# Patient Record
Sex: FEMALE | Race: WHITE | NOT HISPANIC OR LATINO | Employment: OTHER | URBAN - METROPOLITAN AREA
[De-identification: names, ages, dates, MRNs, and addresses within clinical notes are randomized per-mention and may not be internally consistent; named-entity substitution may affect disease eponyms.]

---

## 2023-11-27 ENCOUNTER — OFFICE VISIT (OUTPATIENT)
Dept: ORTHOPEDIC SURGERY | Facility: CLINIC | Age: 83
End: 2023-11-27
Payer: MEDICARE

## 2023-11-27 ENCOUNTER — ANCILLARY PROCEDURE (OUTPATIENT)
Dept: RADIOLOGY | Facility: CLINIC | Age: 83
End: 2023-11-27
Payer: MEDICARE

## 2023-11-27 DIAGNOSIS — M19.019 SHOULDER ARTHRITIS: Primary | ICD-10-CM

## 2023-11-27 DIAGNOSIS — M25.511 CHRONIC RIGHT SHOULDER PAIN: ICD-10-CM

## 2023-11-27 DIAGNOSIS — G89.29 CHRONIC RIGHT SHOULDER PAIN: ICD-10-CM

## 2023-11-27 PROCEDURE — 2500000004 HC RX 250 GENERAL PHARMACY W/ HCPCS (ALT 636 FOR OP/ED): Performed by: NURSE PRACTITIONER

## 2023-11-27 PROCEDURE — 99213 OFFICE O/P EST LOW 20 MIN: CPT | Performed by: NURSE PRACTITIONER

## 2023-11-27 PROCEDURE — 1159F MED LIST DOCD IN RCRD: CPT | Performed by: NURSE PRACTITIONER

## 2023-11-27 PROCEDURE — 73030 X-RAY EXAM OF SHOULDER: CPT | Mod: RIGHT SIDE | Performed by: RADIOLOGY

## 2023-11-27 PROCEDURE — 2500000005 HC RX 250 GENERAL PHARMACY W/O HCPCS: Performed by: NURSE PRACTITIONER

## 2023-11-27 PROCEDURE — 73030 X-RAY EXAM OF SHOULDER: CPT | Mod: RT,FY

## 2023-11-27 PROCEDURE — 1160F RVW MEDS BY RX/DR IN RCRD: CPT | Performed by: NURSE PRACTITIONER

## 2023-11-27 RX ADMIN — TRIAMCINOLONE ACETONIDE 40 MG: 40 INJECTION, SUSPENSION INTRA-ARTICULAR; INTRAMUSCULAR at 09:51

## 2023-11-27 RX ADMIN — LIDOCAINE HYDROCHLORIDE 4 ML: 10 INJECTION, SOLUTION INFILTRATION; PERINEURAL at 09:51

## 2023-11-27 NOTE — PROGRESS NOTES
"Diagnoses/Problems  Assessed    · Shoulder pain, right (719.41) (M25.511)   · Shoulder arthritis (716.91) (M19.019)    Provider Impressions  If she decides on surgery, will not be until next Spring 2024. The injections continue to help her approximately every 10 weeks. She would like another one today. We reviewed the surgery process today and she is comfortable continuing with injections currently. We reviewed her new xrays of right shoulder today. We have scheduled to see her back in 10 weeks. No repeat imaging at that time. She is aware that she needs 3 month interval between injections and surgery.     All of her questions were answered today and she is comfortable with this plan. She knows how to reach the office if she has any additional questions prior to being seen again.     Should you have any questions or concerns after your visit today, please do not hesitate to call the office at [phone]. We strive to give you high-quality, patient-centered, collaborative care and I am honored to be a part of your health care team.        Chief Complaint  FUV- R shoulder injection      History of Present Illness  Luz \"Sarahi\" Shekhar is a pleasant 82 year old female, right hand dominant, returning to clinic today for evaluation of her right shoulder. She explains that she has a history of shoulder arthritis. In the past steroid injections have helped anywhere form 3-6 months at a time and she is hoping for a steroid injection today for her right shoulder. Her left shoulder only bothers her intermittently, but her range of motion continues to be quite good on this side. She has never had any injections for her left shoulder and she is fine with that management at this time. She reports that her right shoulder injection wore off in about a month and she is ready for a repeat right injection today.     She notices the pain when she swims, but she is still able to do it three times per week. She is able to do the motion, " it just hurts. She will take Voltaren topical gel which helps her better than Tylenol currently. Recently underwent thumb surgery, doing well with that.     9/25/23 - Patient would like consideration of a repeat injection for her right shoulder today. This has been working well for her for about 3 months. She continues to do aerobics in the water.     11/27/23 - She continues to get approximately 10 weeks of relief from the injection in her right shoulder. She may decide to pursue shoulder replacement next year. She also understands that it is totally up to her when and if replacement takes place.     Non-diabetic.     Past medical history, surgical history, social history, and family history were all reviewed and are as per the [facility] patient health history questionnaire form that was signed and scanned into the chart.      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 Breathes with normal chest rises. Pupils are round and symmetric today. Awake alert and oriented x3.    Examination of the left shoulder reveals the skin to be intact. 160 degrees of forward elevation.    Examination of the right shoulder reveals the skin to be intact. Symptoms of pain do not refer past her elbow. Forward elevation to 130 degrees. External rotation to 10 degrees. Internal rotation to her sacrum. Abduction to 90 degrees with pain.     Results/Data  X-ray bilateral shoulder (1/31/22) shows no signs of any fracture or dislocation. With signs of moderate glenohumeral arthritis, right side, mild changes on the left.     Xray  today (11/27/23) of right shoulder reveals severe arthritis. No signs of fracture or dislocation.   Reviewed imaging personally with patient today.      Procedure  Risks, benefits and alternatives of injection discussed with the patient prior to injection. After receiving verbal informed consent from the patient, I sterilely prepped the RIGHT shoulder posteriorly and under sterile conditions injected 40mg of Kenalog and 4ml of 1% lidocaine into the posterior glenohumeral joint space. Injection site wiped with a sterile gauze after procedure and Band-Aid placed. The patient tolerated the procedure well and without complication. They can use ice on injection site if discomfort following injection today. Allow 24-48 hours for the injection to start to relieve pain and discomfort of the shoulder. Limit overhead and lifting activities for 48 hours.    L Inj/Asp: R glenohumeral on 12/10/2023 11:18 PM  Indications: pain  Details: 21 G needle, posterior approach  Procedure, treatment alternatives, risks and benefits explained, specific risks discussed. Consent was given by the patient. Immediately prior to procedure a time out was called to verify the correct patient, procedure, equipment, support staff and site/side marked as required. Patient was prepped and draped in the usual sterile fashion.

## 2023-11-27 NOTE — PROGRESS NOTES
Patient ID: Luz Corrigan is a 83 y.o. female.    L Inj/Asp: R glenohumeral on 11/27/2023 9:51 AM  Indications: pain  Details: 22 G needle  Medications: 40 mg triamcinolone acetonide 40 mg/mL; 4 mL lidocaine 10 mg/mL (1 %)  Outcome: tolerated well, no immediate complications  Procedure, treatment alternatives, risks and benefits explained, specific risks discussed. Consent was given by the patient. Immediately prior to procedure a time out was called to verify the correct patient, procedure, equipment, support staff and site/side marked as required. Patient was prepped and draped in the usual sterile fashion.

## 2023-12-04 ENCOUNTER — APPOINTMENT (OUTPATIENT)
Dept: ORTHOPEDIC SURGERY | Facility: CLINIC | Age: 83
End: 2023-12-04
Payer: MEDICARE

## 2023-12-10 PROCEDURE — 20610 DRAIN/INJ JOINT/BURSA W/O US: CPT | Performed by: NURSE PRACTITIONER

## 2023-12-10 RX ORDER — TRIAMCINOLONE ACETONIDE 40 MG/ML
40 INJECTION, SUSPENSION INTRA-ARTICULAR; INTRAMUSCULAR
Status: COMPLETED | OUTPATIENT
Start: 2023-11-27 | End: 2023-11-27

## 2023-12-10 RX ORDER — LIDOCAINE HYDROCHLORIDE 10 MG/ML
4 INJECTION INFILTRATION; PERINEURAL
Status: COMPLETED | OUTPATIENT
Start: 2023-11-27 | End: 2023-11-27

## 2024-01-15 ENCOUNTER — APPOINTMENT (OUTPATIENT)
Dept: ORTHOPEDIC SURGERY | Facility: CLINIC | Age: 84
End: 2024-01-15
Payer: MEDICARE

## 2024-01-22 ENCOUNTER — OFFICE VISIT (OUTPATIENT)
Dept: ORTHOPEDIC SURGERY | Facility: CLINIC | Age: 84
End: 2024-01-22
Payer: MEDICARE

## 2024-01-22 DIAGNOSIS — M25.511 CHRONIC RIGHT SHOULDER PAIN: ICD-10-CM

## 2024-01-22 DIAGNOSIS — M19.019 SHOULDER ARTHRITIS: Primary | ICD-10-CM

## 2024-01-22 DIAGNOSIS — G89.29 CHRONIC RIGHT SHOULDER PAIN: ICD-10-CM

## 2024-01-22 PROCEDURE — 99212 OFFICE O/P EST SF 10 MIN: CPT | Performed by: NURSE PRACTITIONER

## 2024-01-22 PROCEDURE — 1159F MED LIST DOCD IN RCRD: CPT | Performed by: NURSE PRACTITIONER

## 2024-01-22 PROCEDURE — 1160F RVW MEDS BY RX/DR IN RCRD: CPT | Performed by: NURSE PRACTITIONER

## 2024-01-22 PROCEDURE — 20610 DRAIN/INJ JOINT/BURSA W/O US: CPT | Performed by: NURSE PRACTITIONER

## 2024-01-22 NOTE — PROGRESS NOTES
"Provider Impression/Assessment:   Patient with right shoulder arthritis, sustained relief with injections every 10 weeks. Not quite ready for shoulder replacement.     Patient Discussion/Plan:  We reviewed with patient today the various options for the arthritis in their shoulder. They can do nothing and continue with activity modifications. Consider cortisone injections into the glenohumeral joint. This would give pain relief that is temporary and would not stop the progression of arthritis. This injection can last not at all, for 2 weeks, 4 weeks, 3 months or longer. Each patient has a different outcome. The risk of injection is fairly minimal, but does included a very small chance of infection. Consider a total shoulder replacement. This will give the patient a more permanent solution to pain relief. It would also improve function. This is an elective procedure and there is no rush.     If she decides on surgery, will not be until this Spring/Summer 2024. The injections continue to help her approximately every 10 weeks. She would like another one today. We reviewed the surgery process today and she is comfortable continuing with injections currently. We reviewed her new xrays of right shoulder at her last visit. No new imaging today.     We have scheduled to see her back in 10 weeks. No repeat imaging at that time. She is aware that she needs 3 month interval between injections and surgery. All of her questions were answered today and she is comfortable with this plan. She knows how to reach the office if she has any additional questions prior to being seen again.      _______________________________________________________________________________________________________________________________________   Chief Complaint  FUV- R shoulder injection   DOI: 11/27/23     History of Present Illness  Luz \"Sarahi\" Shekhar is a pleasant 82 year old female, right hand dominant, returning to clinic today for evaluation of her " right shoulder. She explains that she has a history of shoulder arthritis. In the past steroid injections have helped anywhere form 3-6 months at a time and she is hoping for a steroid injection today for her right shoulder. Her left shoulder only bothers her intermittently, but her range of motion continues to be quite good on this side. She has never had any injections for her left shoulder and she is fine with that management at this time. She reports that her right shoulder injection wore off in about a month and she is ready for a repeat right injection today.      She notices the pain when she swims, but she is still able to do it three times per week. She is able to do the motion, it just hurts. She will take Voltaren topical gel which helps her better than Tylenol currently. Recently underwent thumb surgery, doing well with that.      9/25/23 - Patient would like consideration of a repeat injection for her right shoulder today. This has been working well for her for about 3 months. She continues to do aerobics in the water.      11/27/23 - She continues to get approximately 10 weeks of relief from the injection in her right shoulder. She may decide to pursue shoulder replacement next year. She also understands that it is totally up to her when and if replacement takes place.     1/22/24 - Patient returns for her right shoulder. She continues to get approx. 10 weeks of sustained relief with injections and would like another one today. She continues to swim, which helps her shoulder with mobility and does not hurt as much in the water. She understands the 3 month interval between injections and surgery and that there is no rush and the decision is fully hers.     Non-diabetic.      Past medical history, surgical history, social history, and family history were all reviewed and are as per the [facility] patient health history questionnaire form that was signed and scanned into the chart.      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 Breathes with normal chest rises. Pupils are round and symmetric today. Awake alert and oriented x3.     Examination of the left shoulder reveals the skin to be intact. 160 degrees of forward elevation.     Examination of the right shoulder reveals the skin to be intact. Symptoms of pain do not refer past her elbow. Forward elevation to 130 degrees. External rotation to 10 degrees. Internal rotation to her sacrum. Abduction to 90 degrees with pain.      Results/Data  X-ray bilateral shoulder (1/31/22) shows no signs of any fracture or dislocation. With signs of moderate glenohumeral arthritis, right side, mild changes on the left.      Xray today (11/27/23) of right shoulder reveals severe arthritis. No signs of fracture or dislocation.     No new imaging to review today.      Procedure  Risks, benefits and alternatives of injection discussed with the patient prior to injection. After receiving verbal informed consent from the patient, I sterilely prepped the RIGHT shoulder posteriorly and under sterile conditions injected 40mg of Kenalog and 4ml of 1% lidocaine into the posterior glenohumeral joint space. Injection site wiped with a sterile gauze after procedure and Band-Aid placed. The patient tolerated the procedure well and without complication. They can use ice on injection site if discomfort following injection today. Allow 24-48 hours for the  injection to start to relieve pain and discomfort of the shoulder. Limit overhead and lifting activities for 48 hours.    L Inj/Asp: R glenohumeral on 1/22/2024 5:56 PM  Indications: pain  Details: 21 G needle, posterior approach  Procedure, treatment alternatives, risks and benefits explained, specific risks discussed. Consent was given by the patient. Immediately prior to procedure a time out was called to verify the correct patient, procedure, equipment, support staff and site/side marked as required. Patient was prepped and draped in the usual sterile fashion.

## 2024-04-01 ENCOUNTER — APPOINTMENT (OUTPATIENT)
Dept: ORTHOPEDIC SURGERY | Facility: CLINIC | Age: 84
End: 2024-04-01
Payer: MEDICARE

## 2024-04-08 ENCOUNTER — APPOINTMENT (OUTPATIENT)
Dept: ORTHOPEDIC SURGERY | Facility: CLINIC | Age: 84
End: 2024-04-08
Payer: MEDICARE

## 2024-04-29 ENCOUNTER — OFFICE VISIT (OUTPATIENT)
Dept: ORTHOPEDIC SURGERY | Facility: CLINIC | Age: 84
End: 2024-04-29
Payer: MEDICARE

## 2024-04-29 DIAGNOSIS — Z01.818 PRE-OP EVALUATION: ICD-10-CM

## 2024-04-29 DIAGNOSIS — M19.019 SHOULDER ARTHRITIS: Primary | ICD-10-CM

## 2024-04-29 PROCEDURE — 99214 OFFICE O/P EST MOD 30 MIN: CPT | Performed by: NURSE PRACTITIONER

## 2024-04-29 PROCEDURE — 1160F RVW MEDS BY RX/DR IN RCRD: CPT | Performed by: NURSE PRACTITIONER

## 2024-04-29 PROCEDURE — 2500000004 HC RX 250 GENERAL PHARMACY W/ HCPCS (ALT 636 FOR OP/ED): Performed by: NURSE PRACTITIONER

## 2024-04-29 PROCEDURE — 1159F MED LIST DOCD IN RCRD: CPT | Performed by: NURSE PRACTITIONER

## 2024-04-29 PROCEDURE — 2500000005 HC RX 250 GENERAL PHARMACY W/O HCPCS: Performed by: NURSE PRACTITIONER

## 2024-05-02 ENCOUNTER — HOSPITAL ENCOUNTER (OUTPATIENT)
Facility: HOSPITAL | Age: 84
Setting detail: OUTPATIENT SURGERY
End: 2024-05-02
Attending: ORTHOPAEDIC SURGERY | Admitting: ORTHOPAEDIC SURGERY
Payer: MEDICARE

## 2024-05-02 DIAGNOSIS — M19.019 SHOULDER ARTHRITIS: Primary | ICD-10-CM

## 2024-05-02 PROCEDURE — 2500000004 HC RX 250 GENERAL PHARMACY W/ HCPCS (ALT 636 FOR OP/ED): Performed by: NURSE PRACTITIONER

## 2024-05-02 PROCEDURE — 20610 DRAIN/INJ JOINT/BURSA W/O US: CPT | Performed by: NURSE PRACTITIONER

## 2024-05-02 PROCEDURE — 2500000005 HC RX 250 GENERAL PHARMACY W/O HCPCS: Performed by: NURSE PRACTITIONER

## 2024-05-02 RX ORDER — LIDOCAINE HYDROCHLORIDE 10 MG/ML
4 INJECTION INFILTRATION; PERINEURAL
Status: COMPLETED | OUTPATIENT
Start: 2024-05-02 | End: 2024-05-02

## 2024-05-02 RX ORDER — TRIAMCINOLONE ACETONIDE 40 MG/ML
40 INJECTION, SUSPENSION INTRA-ARTICULAR; INTRAMUSCULAR
Status: COMPLETED | OUTPATIENT
Start: 2024-05-02 | End: 2024-05-02

## 2024-05-02 RX ADMIN — LIDOCAINE HYDROCHLORIDE 4 ML: 10 INJECTION, SOLUTION INFILTRATION; PERINEURAL at 20:36

## 2024-05-02 RX ADMIN — TRIAMCINOLONE ACETONIDE 40 MG: 400 INJECTION, SUSPENSION INTRA-ARTICULAR; INTRAMUSCULAR at 20:36

## 2024-05-03 ENCOUNTER — HOSPITAL ENCOUNTER (OUTPATIENT)
Dept: RADIOLOGY | Facility: CLINIC | Age: 84
Discharge: HOME | End: 2024-05-03
Payer: MEDICARE

## 2024-05-03 DIAGNOSIS — M19.019 SHOULDER ARTHRITIS: ICD-10-CM

## 2024-05-03 PROCEDURE — 73200 CT UPPER EXTREMITY W/O DYE: CPT | Mod: RT

## 2024-05-03 NOTE — PROGRESS NOTES
Provider Impression/Assessment:  Luz Corrigan has end-stage arthritis of right shoulder. She tolerated the injection well for her right shoulder today.     Patient Discussion/Plan:  Patient has failed conservative management of injections, therapy and anti-inflammatories. The risks benefits and alternatives of an anatomic shoulder replacement were discussed at length with Dr Osei previously. This was reviewed today. The risks of surgery are infection, dislocation, nerve injury, blood loss. The benefits are pain relief and restoration of function and activities of daily life. The patient verbalize an understanding of the risks benefits alternatives and the expected outcomes and wishes to proceed with elective surgery. The rehabilitation after surgery was discussed at length. It would be 4 weeks in a sling with activities as tolerated at the waist level. Following this they will get into physical therapy for stretching and range of motion exercises. We anticipate they will make a near full recovery around 3-4 months, but with continued improvement up to a year after surgery. We also discussed the hospital course. Usually patients stay one night but sometimes they are able to leave same day. We will plan on Luz Corrigan leaving same day.     PLAN is RIGHT anatomic total shoulder replacement, possible reverse replacement pending CT scan and final review by Dr Osei.      We have scheduled them for shoulder replacement on 7/24/24 to take place at [facility]. They will need to be fitted for the post-operative sling closer to surgery. They will need to be seen and cleared by the anesthesia team prior to surgery. Patient will need to obtain a CT scan prior to surgery of their right shoulder to be sure that we are not missing anything from a structural standpoint and for pre-operative planning.     Patient was scheduled to see Dr Osei in early June for final surgical plan and CT imaging review.    "    All of their questions were answered today to their satisfaction and they are in agreement with the above plan. They know how to reach the office if any additional questions arise prior to surgery date.     Should you have any questions or concerns after your visit today, please do not hesitate to call the office at [phone]. I am honored to be a provider on your health care team and remain dedicated to helping you achieve your healthcare goals    -------------------------------------------------------------------------------------------------  CHIEF COMPLAINT:  PRE-OPERATIVE VISIT  RIGHT SHOULDER  INJECTION    History of Present Illness:  Luz \"Sarahi\" Shekhar is a pleasant 82 year old female, right hand dominant, returning to clinic today for evaluation of her right shoulder. She explains that she has a history of shoulder arthritis. In the past steroid injections have helped anywhere form 3-6 months at a time and she is hoping for a steroid injection today for her right shoulder. Her left shoulder only bothers her intermittently, but her range of motion continues to be quite good on this side. She has never had any injections for her left shoulder and she is fine with that management at this time. She reports that her right shoulder injection wore off in about a month and she is ready for a repeat right injection today.      She notices the pain when she swims, but she is still able to do it three times per week. She is able to do the motion, it just hurts. She will take Voltaren topical gel which helps her better than Tylenol currently. Recently underwent thumb surgery, doing well with that.      9/25/23 - Patient would like consideration of a repeat injection for her right shoulder today. This has been working well for her for about 3 months. She continues to do aerobics in the water.      11/27/23 - She continues to get approximately 10 weeks of relief from the injection in her right shoulder. She may " decide to pursue shoulder replacement next year. She also understands that it is totally up to her when and if replacement takes place.      1/22/24 - Patient returns for her right shoulder. She continues to get approx. 10 weeks of sustained relief with injections and would like another one today. She continues to swim, which helps her shoulder with mobility and does not hurt as much in the water. She understands the 3 month interval between injections and surgery and that there is no rush and the decision is fully hers.    4/29/24 - Sarahi returns today for her right shoulder. She gets about 8-10 weeks of sustained with injections with her right shoulder. She continues to swim, which helps her shoulder with mobility and does not hurt as much in the water. She would like another injection for her shoulder today, with plan of moving forward with replacement surgery at the end of July 2024. Her daughter is on speaker phone for the office visit today.      Non-diabetic.   Non smoker     Past medical history, surgical history, social history, and family history were all reviewed and are as per the [facility] patient health history questionnaire form that was signed and scanned into the chart.       Review of Systems:   Review of systems for all 14 systems is negative for complaint except for the above noted findings in the history of present illness.    Family, social, and medical histories are obtained and reviewed.    Allergies:  Not on File    No past medical history on file.    No past surgical history on file.    Social History     Socioeconomic History    Marital status:      Spouse name: Not on file    Number of children: Not on file    Years of education: Not on file    Highest education level: Not on file   Occupational History    Not on file   Tobacco Use    Smoking status: Not on file    Smokeless tobacco: Not on file   Substance and Sexual Activity    Alcohol use: Not on file    Drug use: Not on file    Sexual  activity: Not on file   Other Topics Concern    Not on file   Social History Narrative    Not on file     Social Determinants of Health     Financial Resource Strain: Medium Risk (4/13/2023)    Received from [facility]    Overall Financial Resource Strain (CARDIA)     Difficulty of Paying Living Expenses: Somewhat hard   Food Insecurity: No Food Insecurity (1/2/2024)    Received from [facility]    Hunger Vital Sign     Worried About Running Out of Food in the Last Year: Never true     Ran Out of Food in the Last Year: Never true   Transportation Needs: No Transportation Needs (1/2/2024)    Received from [facility]    PRAPARE - Transportation     Lack of Transportation (Medical): No     Lack of Transportation (Non-Medical): No   Physical Activity: Sufficiently Active (1/2/2024)    Received from [facility]    Exercise Vital Sign     Days of Exercise per Week: 3 days     Minutes of Exercise per Session: 120 min   Stress: No Stress Concern Present (1/2/2024)    Received from [facility]    [place] of Occupational [employer] - Occupational Stress Questionnaire     Feeling of Stress : Not at all   Social Connections: Moderately Isolated (1/2/2024)    Received from [facility]    Social Connection and Isolation Panel [NHANES]     Frequency of Communication with Friends and Family: More than three times a week     Frequency of Social Gatherings with Friends and Family: Three times a week     Attends [employer]al Services: Never     Active Member of Clubs or Organizations: Yes     Attends Club or Organization Meetings: More than 4 times per year     Marital Status:    Intimate Partner Violence: Not At Risk (1/2/2024)    Received from [facility]    Humiliation, Afraid, Rape, and Kick questionnaire     Fear of Current or Ex-Partner: No     Emotionally Abused: No     Physically Abused: No     Sexually Abused: No   Housing Stability: Low Risk  (1/2/2024)    Received from [facility]    Housing Stability Vital Sign      Unable to Pay for Housing in the Last Year: No     Number of Places Lived in the Last Year: 1     Unstable Housing in the Last Year: No       Medications:  No current outpatient medications on file.    Family History:  No family history on file.    Diagnoses/Problems:  RIGHT shoulder arthritis    Physical Examination:  Patient is a well-developed, well-nourished female in no acute distress. Breathes with normal chest rises. Pupils are round and symmetric today. Awake alert and oriented x3.     Examination of the left shoulder reveals the skin to be intact. 160 degrees of forward elevation.     Examination of the right shoulder reveals the skin to be intact. Symptoms of pain do not refer past her elbow. Forward elevation to 130 degrees. External rotation to 10 degrees. Internal rotation to her sacrum. Abduction to 90 degrees with pain.     Results/Imaging:  X-ray bilateral shoulder (1/31/22) shows no signs of any fracture or dislocation. With signs of moderate glenohumeral arthritis, right side, mild changes on the left.      Xray today (11/27/23) of right shoulder reveals severe arthritis. No signs of fracture or dislocation.     Procedure  Risks, benefits and alternatives of injection discussed with the patient prior to injection. After receiving verbal informed consent from the patient, I sterilely prepped the RIGHT shoulder posteriorly and under sterile conditions injected 40mg of Kenalog and 4ml of 1% lidocaine into the posterior glenohumeral joint space. Injection site wiped with a sterile gauze after procedure and Band-Aid placed. The patient tolerated the procedure well and without complication. They can use ice on injection site if discomfort following injection today. Allow 24-48 hours for the injection to start to relieve pain and discomfort of the shoulder. Limit overhead and lifting activities for 48 hours.                Medical Equipment:  Patient was prescribed a shoulder sling for postoperative  care. The patient will have weakness, instability and/or deformity of their right arm which requires stabilization from this orthosis to improve their function after surgery.     Verbal and written instructions for the use, wear schedule, cleaning and application of this item were given. Patient was instructed that should the brace result in increased pain, decreased sensation, increased swelling, or an overall worsening of their medical condition, to please contact our office immediately at [phone].     Orthotic management and training was provided for skin care, modifications due to healing tissues, edema changes, interruption in skin integrity, and safety precautions with the orthosis.      L Inj/Asp: R glenohumeral on 5/2/2024 8:36 PM  Indications: pain  Details: 21 G needle, posterior approach  Medications: 40 mg triamcinolone acetonide 40 mg/mL; 4 mL lidocaine 10 mg/mL (1 %)  Procedure, treatment alternatives, risks and benefits explained, specific risks discussed. Consent was given by the patient. Immediately prior to procedure a time out was called to verify the correct patient, procedure, equipment, support staff and site/side marked as required. Patient was prepped and draped in the usual sterile fashion.       *While intending to generate a timely document that accurately reflects the content of the visit, no guarantee can be provided that every grammatical or spelling mistake has been or will be identified or corrected. Thank you for your understanding.

## 2024-06-02 NOTE — PROGRESS NOTES
Subjective    Patient ID: Luz Corrigan is a 83 y.o. female.    Chief Complaint: No chief complaint on file.     Last Surgery: No surgery found  Last Surgery Date: No surgery found    PAMELLA Escobar is a 83 y.o. right hand dominant female presenting today for evaluation of their right shoulder.     She is here today to discuss her recent CT imaging and pre-surgical planning. Her shoulder is not doing well today. She last saw David 5 weeks ago. She set up her surgical date on July 24th. Injection she was given did help but have started to become less effective.     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     Past medical history, surgical history, social history, and family history were all reviewed and are as per the [facility] patient health history questionnaire form that I signed and scanned into the chart today.     Objective   Ortho Exam  Patient is a well-developed, well-nourished female in no acute distress. Breathes with normal chest rises. Pupils are round and symmetric today. Awake alert and oriented x3.     Examination of the left shoulder reveals the skin to be intact. 160 degrees of forward elevation.     Examination of the right shoulder reveals the skin to be intact. Symptoms of pain do not refer past her elbow. Forward elevation to 130 degrees. External rotation to 10 degrees. Internal rotation to her sacrum. Abduction to 90 degrees with pain.     Image Results:  CT shoulder right wo IV contrast  Narrative: Interpreted By:  Kamron Kim,   STUDY:  CT of the  right shoulder without intravenous contrast dated  5/3/2024.      INDICATION:  Signs/Symptoms:blueprint protocol, PRE-OP PLANNING      COMPARISON:  None.      ACCESSION NUMBER(S):  [id number]      ORDERING CLINICIAN:  DAVID NGUYEN      TECHNIQUE:  Axial CT of the   right shoulder was performed  without  intravenous  contrast. Blueprint protocol was utilized. Sagittal and coronal 2  dimensional reformats were obtained.      FINDINGS:  OSSEOUS STRUCTURES AND JOINTS:      No fracture or dislocation is evident. There is severe glenohumeral  degenerative change. There is moderate acromioclavicular joint  degenerative change. Moderate degenerative changes seen of the  sternoclavicular joints as visualized. Multilevel degenerative  changes seen of the visualized spine. There is a moderate volume  glenohumeral joint effusion mostly collecting in the subscapular  recess with an intracapsular osteochondral body in the subscapular  recess.      MUSCLES AND TENDONS:      There is mild-to-moderate atrophy in the teres minor muscle out of  proportion to the remainder of the musculature.      ASSOCIATED SOFT TISSUES:      The thyroid gland is enlarged and heterogeneous. Calcified  atheromatous disease is seen in the visualized arterial tree.  Respiratory motion limits assessment of the lung parenchyma. Given  this, atelectasis is seen in the lungs.      Impression: 1. Severe glenohumeral and moderate acromioclavicular joint  degenerative change.  2. Mild-to-moderate atrophy of the teres minor muscle out of  proportion to the remainder of the musculature. This can be seen with  quadrilateral space syndrome.  3. Moderate volume glenohumeral joint effusion with intracapsular  osteochondral body.  4. Enlarged heterogeneous thyroid gland. Thyroid ultrasound is  recommended for further assessment.      MACRO:  none      Signed by: Kamron Kim 5/3/2024 3:12 PM  Dictation workstation:   [id number]  X-rays of the right shoulder taken 11/27/23 reveal end stage arthritis    Pre-surgical CT scan of the right shoulder taken 05/03/24 reveals end stage arthritis. Intact rotator cuff. No sign of any significant fatty degeneration.     Assessment/Plan   Encounter Diagnoses:  No diagnosis found.  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       1. Do nothing and continue activity modifications.     2. Consider cortisone injections into the glenohumeral joint. This would give pain relief that is temporary. This would not stop the progression of arthritis. For some patients, this injection can provide no relief at all, relief for 2 weeks, 4 weeks, 3 months or longer. The risk of the injection is fairly minimal but does include a very small chance of infection.     3. Another option is a total shoulder replacement. This will give the patient a more permanent solution to pain relief. It would also improve function. There is no rush to this procedure; it is an elective procedure.      Luz has severe arthritis in their shoulder. They have failed conservative management for over 6 months with injections, therapy and home exercises. They cannot continue living with their shoulders secondary to pain and disability. They have severe erosive, destructive changes within their shoulder joint limitation of motion that do not allow them to get above their shoulder level or reach behind their back. Because of the erosive and destructive changes in their shoulder joint as seen on CT scan and plain films, in addition to their severe limitation of range of motion we have recommended an anatomic shoulder replacement which will help with theirpain as well as improve their function.    Luz has severe arthritis in their shoulder. They have failed conservative management for over 6 months with injections, therapy and home exercises. They cannot continue living with their shoulders secondary to pain and disability. They have severe erosive, destructive changes within their shoulder joint limitation of motion that do not allow them to get above their shoulder level or reach behind their back. Because of the erosive and destructive changes in their shoulder joint as seen on  CT scan and plain films, in addition to their severe limitation of range of motion we have recommended a reverse shoulder replacement which will help with their pain as well as improve their function.    Discussed (treatment options). Risks, benefits, and alternatives were explained. Patient desires to proceed. Patient verbalizes understanding and has no other questions at this time.    We discussed surgery at length today. She is still in agreement with the plan for surgery.     PLAN for RIGHT anatomic/reverse total shoulder replacement    No orders of the defined types were placed in this encounter.    Follow up will be scheduled appropriately.     Scribe Attestation  By signing my name below, Rosio MILLER Scribe   attest that this documentation has been prepared under the direction and in the presence of Jaden Osei MD.

## 2024-06-04 ENCOUNTER — OFFICE VISIT (OUTPATIENT)
Dept: ORTHOPEDIC SURGERY | Facility: HOSPITAL | Age: 84
End: 2024-06-04
Payer: MEDICARE

## 2024-06-04 DIAGNOSIS — M25.511 CHRONIC RIGHT SHOULDER PAIN: Primary | ICD-10-CM

## 2024-06-04 DIAGNOSIS — G89.29 CHRONIC RIGHT SHOULDER PAIN: Primary | ICD-10-CM

## 2024-06-04 PROCEDURE — 99214 OFFICE O/P EST MOD 30 MIN: CPT | Performed by: ORTHOPAEDIC SURGERY
